# Patient Record
Sex: MALE | Race: WHITE | Employment: OTHER | URBAN - METROPOLITAN AREA
[De-identification: names, ages, dates, MRNs, and addresses within clinical notes are randomized per-mention and may not be internally consistent; named-entity substitution may affect disease eponyms.]

---

## 2018-02-20 ENCOUNTER — APPOINTMENT (OUTPATIENT)
Dept: GENERAL RADIOLOGY | Age: 53
End: 2018-02-20
Attending: EMERGENCY MEDICINE
Payer: MEDICARE

## 2018-02-20 ENCOUNTER — HOSPITAL ENCOUNTER (OUTPATIENT)
Age: 53
Discharge: HOME | End: 2018-02-20
Attending: EMERGENCY MEDICINE
Payer: MEDICARE

## 2018-02-20 VITALS
HEART RATE: 85 BPM | TEMPERATURE: 99 F | WEIGHT: 130 LBS | RESPIRATION RATE: 20 BRPM | BODY MASS INDEX: 23.92 KG/M2 | OXYGEN SATURATION: 99 % | SYSTOLIC BLOOD PRESSURE: 107 MMHG | HEIGHT: 62 IN | DIASTOLIC BLOOD PRESSURE: 64 MMHG

## 2018-02-20 DIAGNOSIS — R68.89 FLU-LIKE SYMPTOMS: Primary | ICD-10-CM

## 2018-02-20 LAB — POCT RAPID STREP: NEGATIVE

## 2018-02-20 PROCEDURE — 87081 CULTURE SCREEN ONLY: CPT | Performed by: EMERGENCY MEDICINE

## 2018-02-20 PROCEDURE — 71046 X-RAY EXAM CHEST 2 VIEWS: CPT | Performed by: EMERGENCY MEDICINE

## 2018-02-20 PROCEDURE — 99214 OFFICE O/P EST MOD 30 MIN: CPT

## 2018-02-20 PROCEDURE — 87430 STREP A AG IA: CPT | Performed by: EMERGENCY MEDICINE

## 2018-02-20 RX ORDER — ATORVASTATIN CALCIUM 20 MG/1
20 TABLET, FILM COATED ORAL NIGHTLY
COMMUNITY

## 2018-02-20 RX ORDER — OSELTAMIVIR PHOSPHATE 75 MG/1
75 CAPSULE ORAL 2 TIMES DAILY
Qty: 10 CAPSULE | Refills: 0 | Status: SHIPPED | OUTPATIENT
Start: 2018-02-20 | End: 2018-02-25

## 2018-02-20 NOTE — ED INITIAL ASSESSMENT (HPI)
Pt presents to [facility] with cough, headache, fever since yesterday. Chest clear all fields. Loose cough present upon arrival. Pt also has sore throat. Pt has hearing difficulties (forgot hearing aide).

## 2018-02-20 NOTE — ED PROVIDER NOTES
Patient presents with:  Sore Throat  Cough/URI  Fever (infectious)    HPI:     Lucas Carpenter is a 50year old male with hx of down's syndrome who presents with chief complaint of cough, congestion, sore throat and fever.   Started yesterday with cough, anselmo HISTORY: (As transcribed by Technologist)  Patient states he has had a cough and congestion since yesterday. FINDINGS:  LUNGS:  Minimal bilateral peribronchial thickening. No focal infiltrate or consolidation. CARDIAC:  Normal size cardiac silhouette.

## 2019-12-30 ENCOUNTER — OFFICE VISIT (OUTPATIENT)
Dept: FAMILY MEDICINE CLINIC | Facility: CLINIC | Age: 54
End: 2019-12-30
Payer: MEDICARE

## 2019-12-30 VITALS
TEMPERATURE: 98 F | SYSTOLIC BLOOD PRESSURE: 122 MMHG | BODY MASS INDEX: 23.92 KG/M2 | OXYGEN SATURATION: 98 % | HEART RATE: 78 BPM | RESPIRATION RATE: 18 BRPM | HEIGHT: 62 IN | WEIGHT: 130 LBS | DIASTOLIC BLOOD PRESSURE: 70 MMHG

## 2019-12-30 DIAGNOSIS — J06.9 VIRAL UPPER RESPIRATORY TRACT INFECTION: Primary | ICD-10-CM

## 2019-12-30 PROCEDURE — 99202 OFFICE O/P NEW SF 15 MIN: CPT | Performed by: PHYSICIAN ASSISTANT

## 2019-12-30 RX ORDER — CEFDINIR 300 MG/1
300 CAPSULE ORAL 2 TIMES DAILY
Qty: 14 CAPSULE | Refills: 0 | Status: SHIPPED | OUTPATIENT
Start: 2019-12-30 | End: 2020-01-06

## 2019-12-30 NOTE — PROGRESS NOTES
CHIEF COMPLAINT:   Patient presents with:  Cough: congestion x 3 days, no sneezing, intermittent coughing, no headache, no fever, no ST     HPI:   Genna Lilly is a 48year old male who presents for upper respiratory symptoms for  3 days.  Patient/caregive palpitations   GI: denies N/V/C or abdominal pain  NEURO: Denies headaches    EXAM:   /70 (BP Location: Left arm, Patient Position: Sitting, Cuff Size: adult)   Pulse 78   Temp 98 °F (36.7 °C) (Oral)   Resp 18   Ht 62\"   Wt 130 lb (59 kg)   SpO2 98% to the ER for any acute worsening of symptoms. Based off the duration and nature of your symptoms, you most likely have a viral upper respiratory infection.  Unfortunately, antibiotics will not treat viral infections and inappropriate use only leads to an primary care provider, or go to one of our [facility] or the [address] [facility] for further evaluation

## 2019-12-30 NOTE — PATIENT INSTRUCTIONS
Please follow up with your PCP if no improvement within 5-7 days. Go directly to the ER for any acute worsening of symptoms. Based off the duration and nature of your symptoms, you most likely have a viral upper respiratory infection.  Unfortunately, anti followed by worsening (double sickening) then please schedule a follow up with your primary care provider, or go to one of our [facility] or the [facility] for further evaluation

## 2021-08-02 ENCOUNTER — HOSPITAL ENCOUNTER (OUTPATIENT)
Age: 56
Discharge: HOME | End: 2021-08-02
Payer: MEDICARE

## 2021-08-02 ENCOUNTER — APPOINTMENT (OUTPATIENT)
Dept: GENERAL RADIOLOGY | Age: 56
End: 2021-08-02
Attending: NURSE PRACTITIONER
Payer: MEDICARE

## 2021-08-02 VITALS
RESPIRATION RATE: 18 BRPM | SYSTOLIC BLOOD PRESSURE: 137 MMHG | TEMPERATURE: 98 F | HEART RATE: 62 BPM | OXYGEN SATURATION: 100 % | DIASTOLIC BLOOD PRESSURE: 74 MMHG | WEIGHT: 130.06 LBS | BODY MASS INDEX: 24 KG/M2

## 2021-08-02 DIAGNOSIS — R05.9 COUGH: Primary | ICD-10-CM

## 2021-08-02 DIAGNOSIS — J01.90 ACUTE NON-RECURRENT SINUSITIS, UNSPECIFIED LOCATION: ICD-10-CM

## 2021-08-02 PROCEDURE — 71046 X-RAY EXAM CHEST 2 VIEWS: CPT | Performed by: NURSE PRACTITIONER

## 2021-08-02 PROCEDURE — 99203 OFFICE O/P NEW LOW 30 MIN: CPT | Performed by: NURSE PRACTITIONER

## 2021-08-02 RX ORDER — BENZONATATE 100 MG/1
100 CAPSULE ORAL 3 TIMES DAILY PRN
Qty: 30 CAPSULE | Refills: 0 | Status: SHIPPED | OUTPATIENT
Start: 2021-08-02 | End: 2021-09-01

## 2021-08-02 RX ORDER — AMOXICILLIN AND CLAVULANATE POTASSIUM 875; 125 MG/1; MG/1
1 TABLET, FILM COATED ORAL 2 TIMES DAILY
Qty: 20 TABLET | Refills: 0 | Status: SHIPPED | OUTPATIENT
Start: 2021-08-02 | End: 2021-08-12

## 2021-08-02 NOTE — ED PROVIDER NOTES
Patient Seen in: Immediate [address] [place] [address]      History   Patient presents with:  Cough/URI    Stated Complaint: sinus congestion    HPI/Subjective:   HPI  72-year-old male with history of Down syndrome and hypothyroid presents with a caregiver for complaint yellowish-green drainage. Mouth/Throat:      Pharynx: No oropharyngeal exudate or posterior oropharyngeal erythema. Cardiovascular:      Rate and Rhythm: Normal rate and regular rhythm. Heart sounds: Normal heart sounds.    Pulmonary:      Effort caregiver.                                Disposition and Plan     Clinical Impression:  Cough  (primary encounter diagnosis)  Acute non-recurrent sinusitis, unspecified location     Disposition:  Discharge  8/2/2021 12:16 pm    Follow-up:  Lincoln Laurent MD

## 2023-02-07 ENCOUNTER — APPOINTMENT (OUTPATIENT)
Dept: GENERAL RADIOLOGY | Age: 58
End: 2023-02-07
Attending: NURSE PRACTITIONER
Payer: MEDICARE

## 2023-02-07 ENCOUNTER — HOSPITAL ENCOUNTER (OUTPATIENT)
Age: 58
Discharge: HOME | End: 2023-02-07
Payer: MEDICARE

## 2023-02-07 VITALS
TEMPERATURE: 97 F | SYSTOLIC BLOOD PRESSURE: 120 MMHG | HEART RATE: 72 BPM | OXYGEN SATURATION: 99 % | DIASTOLIC BLOOD PRESSURE: 82 MMHG | RESPIRATION RATE: 16 BRPM

## 2023-02-07 DIAGNOSIS — J06.9 VIRAL URI WITH COUGH: Primary | ICD-10-CM

## 2023-02-07 LAB — SARS-COV-2 RNA RESP QL NAA+PROBE: NOT DETECTED

## 2023-02-07 PROCEDURE — U0002 COVID-19 LAB TEST NON-CDC: HCPCS | Performed by: NURSE PRACTITIONER

## 2023-02-07 PROCEDURE — 71046 X-RAY EXAM CHEST 2 VIEWS: CPT | Performed by: NURSE PRACTITIONER

## 2023-02-07 PROCEDURE — 99213 OFFICE O/P EST LOW 20 MIN: CPT | Performed by: NURSE PRACTITIONER

## 2023-02-07 RX ORDER — FLUTICASONE PROPIONATE 50 MCG
SPRAY, SUSPENSION (ML) NASAL DAILY
COMMUNITY

## 2023-02-07 RX ORDER — MOMETASONE FUROATE 1 MG/G
CREAM TOPICAL DAILY
COMMUNITY

## 2023-02-07 RX ORDER — LEVOTHYROXINE SODIUM 0.12 MG/1
125 TABLET ORAL
COMMUNITY

## 2023-02-07 RX ORDER — POLYVINYL ALCOHOL 14 MG/ML
1 SOLUTION/ DROPS OPHTHALMIC AS NEEDED
COMMUNITY

## 2023-02-07 RX ORDER — ERGOCALCIFEROL 1.25 MG/1
50000 CAPSULE ORAL WEEKLY
COMMUNITY

## 2023-02-10 ENCOUNTER — EXTERNAL LAB (OUTPATIENT)
Dept: OTHER | Age: 58
End: 2023-02-10

## 2023-02-10 LAB
25(OH)D3+25(OH)D2 SERPL-MCNC: 45.3 NG/ML (ref 30–100)
ALBUMIN SERPL-MCNC: 3.6 GM/DL (ref 3.4–4.8)
ALP SERPL-CCNC: 62 U/L (ref 38–126)
ALT SERPL-CCNC: 16 U/L (ref 2–58)
AST SERPL-CCNC: 25 U/L (ref 5–44)
BASOPHILS # BLD: 0.1 X10E3/UL (ref 0–0.2)
BASOPHILS NFR BLD: 1.7 % (ref 0–1.6)
BILIRUB SERPL-MCNC: 1.3 MG/DL (ref 0.2–1.2)
BUN SERPL-MCNC: 16 MG/DL (ref 5–28)
BUN/CREAT SERPL: 16 RATIO (ref 12–20)
CALCIUM SERPL-MCNC: 9.5 MG/DL (ref 8.4–10.2)
CHLORIDE SERPL-SCNC: 103 MEQ/L (ref 94–111)
CHOLEST SERPL-MCNC: 112 MG/DL
CO2 SERPL-SCNC: 29 MEQ/L (ref 20–33)
CREAT SERPL-MCNC: 1 MG/DL (ref 0.5–1.4)
EOSINOPHIL # BLD: 0 X10E3/UL (ref 0–0.5)
EOSINOPHIL NFR BLD: 0.3 % (ref 2–6)
ERYTHROCYTE [DISTWIDTH] IN BLOOD: 12.4 % (ref 11.5–15.5)
FOLATE SERPL-MCNC: 15.3 NG/ML (ref 3–20)
GLUCOSE SERPL-MCNC: 51 MG/DL (ref 64–112)
HBA1C MFR BLD: 5.1 % (ref 4.6–6.2)
HCT VFR BLD CALC: 46.8 % (ref 42–52)
HDLC SERPL-MCNC: 23 MG/DL
HGB BLD-MCNC: 15.5 GM/DL (ref 14–18)
LDLC SERPL CALC-MCNC: 72 MG/DL
LENGTH OF FAST TIME PATIENT: ABNORMAL H
LENGTH OF FAST TIME PATIENT: ABNORMAL H
LYMPHOCYTES # BLD: 1.9 X10E3/UL (ref 0.8–3)
LYMPHOCYTES NFR BLD: 34.3 % (ref 20–45)
MAGNESIUM SERPL-MCNC: 2.6 MG/DL (ref 1.3–2.5)
MCH RBC QN AUTO: 31.8 PG (ref 27–31)
MCHC RBC AUTO-ENTMCNC: 33.1 G/DL (ref 32–36)
MCV RBC AUTO: 96.1 FL (ref 80–94)
MONOCYTES # BLD: 0.7 X10E3/UL (ref 0.2–1)
MONOCYTES NFR BLD: 12.2 % (ref 2–15)
MPV (OFFPRE2): 7.9 FL (ref 4.4–10)
NEUTROPHILS # BLD: 2.9 X10E3/UL (ref 1.4–6.8)
NEUTROPHILS NFR BLD: 51.6 % (ref 40–70)
PLATELET # BLD: 239 THOUS/CUMM (ref 150–450)
POTASSIUM SERPL-SCNC: 5.2 MEQ/L (ref 3.5–5.1)
PROT SERPL-MCNC: 7.4 GM/DL (ref 5.6–8.3)
RBC # BLD: 4.87 MIL/CUMM (ref 4.7–6.2)
SODIUM SERPL-SCNC: 140 MEQ/L (ref 131–145)
TRIGL SERPL-MCNC: 86 MG/DL
TSH SERPL-ACNC: <0.01 UIU/ML (ref 0.34–5.6)
VIT B12 SERPL-MCNC: 737 PG/ML (ref 213–816)
WBC # BLD: 5.7 THOUS/CUMM (ref 4.8–10.8)

## 2023-02-22 ENCOUNTER — TELEPHONE (OUTPATIENT)
Dept: OTHER | Age: 58
End: 2023-02-22

## 2023-04-06 ENCOUNTER — OFFICE VISIT (OUTPATIENT)
Dept: OTHER | Age: 58
End: 2023-04-06

## 2023-04-06 VITALS
HEIGHT: 60 IN | HEART RATE: 57 BPM | RESPIRATION RATE: 14 BRPM | BODY MASS INDEX: 25.72 KG/M2 | OXYGEN SATURATION: 98 % | TEMPERATURE: 98.5 F | DIASTOLIC BLOOD PRESSURE: 62 MMHG | WEIGHT: 131 LBS | SYSTOLIC BLOOD PRESSURE: 90 MMHG

## 2023-04-06 DIAGNOSIS — Z11.59 NEED FOR HEPATITIS C SCREENING TEST: ICD-10-CM

## 2023-04-06 DIAGNOSIS — J30.2 SEASONAL ALLERGIES: ICD-10-CM

## 2023-04-06 DIAGNOSIS — Z97.4 WEARS HEARING AID IN BOTH EARS: ICD-10-CM

## 2023-04-06 DIAGNOSIS — Q90.9 DOWN SYNDROME: Primary | ICD-10-CM

## 2023-04-06 DIAGNOSIS — Z23 NEED FOR VACCINATION: ICD-10-CM

## 2023-04-06 DIAGNOSIS — E03.9 ACQUIRED HYPOTHYROIDISM: ICD-10-CM

## 2023-04-06 DIAGNOSIS — Z97.3 WEARS GLASSES: ICD-10-CM

## 2023-04-06 PROCEDURE — 99205 OFFICE O/P NEW HI 60 MIN: CPT | Performed by: FAMILY MEDICINE

## 2023-04-06 RX ORDER — FLUTICASONE PROPIONATE 50 MCG
SPRAY, SUSPENSION (ML) NASAL
COMMUNITY
Start: 2022-12-30

## 2023-04-06 RX ORDER — LEVOTHYROXINE SODIUM 112 UG/1
112 TABLET ORAL
COMMUNITY
Start: 2023-03-20

## 2023-04-06 RX ORDER — GUAIFENESIN 200 MG/10ML
LIQUID ORAL EVERY 4 HOURS PRN
COMMUNITY

## 2023-04-06 RX ORDER — EPINEPHRINE 0.3 MG/.3ML
INJECTION SUBCUTANEOUS
COMMUNITY
Start: 2023-01-20

## 2023-04-06 RX ORDER — MOMETASONE FUROATE 1 MG/G
CREAM TOPICAL
COMMUNITY
Start: 2022-12-30

## 2023-04-06 RX ORDER — SENNOSIDES 8.6 MG
650 CAPSULE ORAL
COMMUNITY

## 2023-04-06 RX ORDER — MONTELUKAST SODIUM 10 MG/1
TABLET ORAL
COMMUNITY
Start: 2023-03-17

## 2023-04-06 RX ORDER — IBUPROFEN 400 MG/1
TABLET ORAL
COMMUNITY
Start: 2022-12-30

## 2023-04-06 RX ORDER — POLYVINYL ALCOHOL 14 MG/ML
1 SOLUTION/ DROPS OPHTHALMIC PRN
COMMUNITY

## 2023-04-06 RX ORDER — CHOLECALCIFEROL (VITAMIN D3) 1250 MCG
1.25 CAPSULE ORAL
COMMUNITY

## 2023-04-06 RX ORDER — ATORVASTATIN CALCIUM 20 MG/1
TABLET, FILM COATED ORAL
COMMUNITY
Start: 2023-03-17

## 2023-04-06 ASSESSMENT — PATIENT HEALTH QUESTIONNAIRE - PHQ9
IS PATIENT ABLE TO COMPLETE PHQ2 OR PHQ9: LEARNING/COGNITIVE DISABILITY
IS THE PATIENT ABLE TO COGNITIVELY COMPLETE THE PHQ9: NO

## 2023-04-18 ENCOUNTER — CHARTING TRANS (OUTPATIENT)
Dept: OTHER | Age: 58
End: 2023-04-18

## 2023-04-19 ENCOUNTER — TELEPHONE (OUTPATIENT)
Dept: OTHER | Age: 58
End: 2023-04-19

## 2023-04-21 ENCOUNTER — EXTERNAL LAB (OUTPATIENT)
Dept: OTHER | Age: 58
End: 2023-04-21

## 2023-04-21 LAB
HCV AB SER QL: NONREACTIVE
LAB RESULT: NORMAL

## 2023-05-02 ENCOUNTER — TELEPHONE (OUTPATIENT)
Dept: OTHER | Age: 58
End: 2023-05-02

## 2024-03-11 ENCOUNTER — HOSPITAL ENCOUNTER (OUTPATIENT)
Age: 59
Discharge: HOME | End: 2024-03-11
Payer: COMMERCIAL

## 2024-03-11 ENCOUNTER — APPOINTMENT (OUTPATIENT)
Dept: GENERAL RADIOLOGY | Age: 59
End: 2024-03-11
Attending: NURSE PRACTITIONER
Payer: COMMERCIAL

## 2024-03-11 VITALS
HEART RATE: 59 BPM | BODY MASS INDEX: 28.32 KG/M2 | RESPIRATION RATE: 18 BRPM | WEIGHT: 150 LBS | OXYGEN SATURATION: 100 % | DIASTOLIC BLOOD PRESSURE: 70 MMHG | HEIGHT: 61 IN | SYSTOLIC BLOOD PRESSURE: 104 MMHG | TEMPERATURE: 98 F
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
## 2024-03-11 DIAGNOSIS — R07.9 CHEST PAIN OF UNCERTAIN ETIOLOGY: Primary | ICD-10-CM

## 2024-03-11 DIAGNOSIS — S20.219A CONTUSION OF RIB, UNSPECIFIED LATERALITY, INITIAL ENCOUNTER: ICD-10-CM

## 2024-03-11 PROCEDURE — A9150 MISC/EXPER NON-PRESCRIPT DRU: HCPCS | Performed by: NURSE PRACTITIONER

## 2024-03-11 PROCEDURE — 71111 X-RAY EXAM RIBS/CHEST4/> VWS: CPT | Performed by: NURSE PRACTITIONER

## 2024-03-11 PROCEDURE — 99213 OFFICE O/P EST LOW 20 MIN: CPT | Performed by: NURSE PRACTITIONER

## 2024-03-11 RX ORDER — ACETAMINOPHEN 500 MG
1000 TABLET ORAL ONCE
Status: COMPLETED | OUTPATIENT
Start: 2024-03-11 | End: 2024-03-11

## 2024-03-11 NOTE — DISCHARGE INSTRUCTIONS
The counter Motrin and/or Tylenol as needed for pain.  Follow-up with your doctor.  Go to the ER for new or worsening symptoms.
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
## 2024-04-08 ENCOUNTER — APPOINTMENT (OUTPATIENT)
Dept: OTHER | Age: 59
End: 2024-04-08

## 2024-05-28 ENCOUNTER — APPOINTMENT (OUTPATIENT)
Dept: OTHER | Age: 59
End: 2024-05-28

## 2024-08-20 ENCOUNTER — APPOINTMENT (OUTPATIENT)
Dept: OTHER | Age: 59
End: 2024-08-20

## 2024-08-20 VITALS
BODY MASS INDEX: 26.5 KG/M2 | DIASTOLIC BLOOD PRESSURE: 62 MMHG | TEMPERATURE: 98 F | SYSTOLIC BLOOD PRESSURE: 94 MMHG | HEIGHT: 60 IN | WEIGHT: 135 LBS | OXYGEN SATURATION: 98 % | HEART RATE: 72 BPM

## 2024-08-20 DIAGNOSIS — Q90.9 DOWN SYNDROME (CMD): Primary | ICD-10-CM

## 2024-08-20 DIAGNOSIS — E78.2 MIXED HYPERLIPIDEMIA: ICD-10-CM

## 2024-08-20 DIAGNOSIS — Z97.3 WEARS GLASSES: ICD-10-CM

## 2024-08-20 DIAGNOSIS — E03.9 ACQUIRED HYPOTHYROIDISM: ICD-10-CM

## 2024-08-20 DIAGNOSIS — R41.3 MEMORY CHANGE: ICD-10-CM

## 2024-08-20 DIAGNOSIS — Z97.4 WEARS HEARING AID IN BOTH EARS: ICD-10-CM

## 2024-08-20 ASSESSMENT — PATIENT HEALTH QUESTIONNAIRE - PHQ9
IS THE PATIENT ABLE TO COGNITIVELY COMPLETE THE PHQ9: NO
1. LITTLE INTEREST OR PLEASURE IN DOING THINGS: NOT AT ALL
2. FEELING DOWN, DEPRESSED OR HOPELESS: NOT AT ALL
SUM OF ALL RESPONSES TO PHQ9 QUESTIONS 1 AND 2: 0
IS PATIENT ABLE TO COMPLETE PHQ2 OR PHQ9: LEARNING/COGNITIVE DISABILITY
SUM OF ALL RESPONSES TO PHQ9 QUESTIONS 1 AND 2: 0
CLINICAL INTERPRETATION OF PHQ2 SCORE: NO FURTHER SCREENING NEEDED

## 2024-12-18 ENCOUNTER — HOSPITAL ENCOUNTER (OUTPATIENT)
Age: 59
Discharge: HOME | End: 2024-12-18
Payer: MEDICARE

## 2024-12-18 VITALS
OXYGEN SATURATION: 100 % | HEART RATE: 63 BPM | DIASTOLIC BLOOD PRESSURE: 69 MMHG | RESPIRATION RATE: 16 BRPM | BODY MASS INDEX: 22.5 KG/M2 | HEIGHT: 66 IN | TEMPERATURE: 98 F | WEIGHT: 140 LBS | SYSTOLIC BLOOD PRESSURE: 117 MMHG

## 2024-12-18 DIAGNOSIS — J30.81 ALLERGY TO CATS: Primary | ICD-10-CM

## 2024-12-18 DIAGNOSIS — J06.9 UPPER RESPIRATORY TRACT INFECTION, UNSPECIFIED TYPE: ICD-10-CM

## 2024-12-18 PROCEDURE — 99213 OFFICE O/P EST LOW 20 MIN: CPT | Performed by: NURSE PRACTITIONER

## 2024-12-18 RX ORDER — CETIRIZINE HYDROCHLORIDE 10 MG/1
10 TABLET ORAL DAILY
COMMUNITY

## 2024-12-18 NOTE — ED INITIAL ASSESSMENT (HPI)
Patient has been having congestion since Monday. He has a history of becoming very sick with respiratory related issues. They want him checked to see if there is anything they can do to prevent him from getting worse. Patient has Down Syndrome and states he is \"fine\".

## 2024-12-18 NOTE — ED PROVIDER NOTES
Patient Seen in: [facility]      History     Chief Complaint   Patient presents with    Cough/URI     Stated Complaint: Congestion    Subjective:   HPI      Patient is a 59-year-old male who is here today with congestion and sinus pressure since Monday.  Reports that now he has a cough.  He is here with one of his caregivers who is concerned as the patient usually ends up with significant respiratory related issues.  Patient was exposed to 2 cats when he visited his family for the holiday.  And again this past weekend.  He does have a allergy to cats he denies fevers, chills, nausea, vomiting.  Patient reports that he has no symptoms when asked.    Objective:     Past Medical History:    Chronic lymphocytic thyroiditis    Down syndrome (HCC)    Hearing loss    Hypothyroid    Onychomycosis              History reviewed. No pertinent surgical history.             Social History     Socioeconomic History    Marital status: Single   Tobacco Use    Smoking status: Never    Smokeless tobacco: Never   Vaping Use    Vaping status: Never Used   Substance and Sexual Activity    Alcohol use: Not Currently    Drug use: Never     Social Drivers of Health      Received from [facility], [facility]    Housing Stability              Review of Systems    Positive for stated complaint: Congestion  Other systems are as noted in HPI.  Constitutional and vital signs reviewed.      All other systems reviewed and negative except as noted above.    Physical Exam     ED Triage Vitals [12/18/24 1303]   /69   Pulse 63   Resp 16   Temp 98.1 °F (36.7 °C)   Temp src Oral   SpO2 100 %   O2 Device None (Room air)       Current Vitals:   Vital Signs  BP: 117/69  Pulse: 63  Resp: 16  Temp: 98.1 °F (36.7 °C)  Temp src: Oral    Oxygen Therapy  SpO2: 100 %  O2 Device: None (Room air)        Physical Exam  VS: Vital signs reviewed. O2 saturation within normal limits for this patient     General:  Patient is awake and alert, oriented to person, place and time. Not in acute distress.      HEENT: Head is normocephalic atraumatic. Pupils reactive bilaterally.  EOMs intact.  No oral pallor. Mucous membranes moist.      Lungs: good inspiratory effort. +air entry bilaterally without wheezes, rhonchi, crackles.  No accessory muscle use or tachypnea.       Extremities: No edema.  Pulses 2+ extremities.   Brisk capillary refill noted.      Skin: Normal skin turgor     CNS: Moves all 4 extremities.  Interacts appropriately.  No tremor.  No gait abnormality        ED Course   Labs Reviewed - No data to display         I have personally  reviewed available prior medical records for any recent pertinent discharge summaries/testing. Patient/family updated on results and plan, a verbalized understanding and agreement with the plan.  I explained to the patient that emergent conditions may arise and to go to the ER for new, worsening or any persistent conditions. I've explained the importance of taking all medicatons as prescribed, follow up, and return precuations,  All questions answered.    Please note that this report has been produced using speech recognition software and may contain errors related to that system including, but not limited to, errors in grammar, punctuation, and spelling, as well as words and phrases that possibly may have been recognized inappropriately.  If there are any questions or concerns, contact the dictating provider for clarification.       MDM      Patient presents with cough, patient is nontoxic, does not meet SIRS criteria.   Patient does not have uvula deviation or unilateral tonsillar swelling to indicate tonsillar abscess. No meningsmus or trismus. No dysphagia or difficulty handing secretions. No evidence for otitis media. Patient does not have any respiratory distress.  O2 saturation within normal limits for this patient. Does not appear clinically dehydrated and is tolerating oral  intake. Presentation consistent with a URI.. Encouraged patient on oral hydration and supportive care. Recommend follow up with PCP.  Return to ED precautions discussed with the patient and family.        Medical Decision Making      Disposition and Plan     Clinical Impression:  1. Allergy to cats    2. Upper respiratory tract infection, unspecified type         Disposition:  Discharge  12/18/2024  1:39 pm    Follow-up:  Saroj Whitten MD  [facility] [address]  [phone]                Medications Prescribed:  Discharge Medication List as of 12/18/2024  1:40 PM              Supplementary Documentation:

## 2025-01-17 ENCOUNTER — HOSPITAL ENCOUNTER (OUTPATIENT)
Age: 60
Discharge: HOME | End: 2025-01-17
Payer: MEDICARE

## 2025-01-17 VITALS — TEMPERATURE: 99 F | OXYGEN SATURATION: 96 % | RESPIRATION RATE: 20 BRPM | HEART RATE: 91 BPM

## 2025-01-17 DIAGNOSIS — J11.1 INFLUENZA: ICD-10-CM

## 2025-01-17 DIAGNOSIS — R05.9 COUGH: Primary | ICD-10-CM

## 2025-01-17 LAB
POCT INFLUENZA A: POSITIVE
POCT INFLUENZA B: NEGATIVE

## 2025-01-17 RX ORDER — OSELTAMIVIR PHOSPHATE 75 MG/1
75 CAPSULE ORAL 2 TIMES DAILY
Qty: 10 CAPSULE | Refills: 0 | Status: SHIPPED | OUTPATIENT
Start: 2025-01-17 | End: 2025-01-22

## 2025-01-17 NOTE — ED PROVIDER NOTES
Patient Seen in: [facility]      History     Chief Complaint   Patient presents with    Cough/URI    Fever     Stated Complaint: cough/fever    Subjective:   59-year-old male, with history of Down syndrome, hypothyroidism, brought in by his caregiver for development of cough and fever that started this morning.  Caregiver states he was given guaifenesin and Tylenol earlier today.  No vomiting.  No known sick exposure.              Objective:     Past Medical History:    Chronic lymphocytic thyroiditis    Down syndrome (HCC)    Hearing loss    Hypothyroid    Onychomycosis              History reviewed. No pertinent surgical history.             Social History     Socioeconomic History    Marital status: Single   Tobacco Use    Smoking status: Never    Smokeless tobacco: Never   Vaping Use    Vaping status: Never Used   Substance and Sexual Activity    Alcohol use: Not Currently    Drug use: Never     Social Drivers of Health      Received from [facility], [facility]    Housing Stability              Review of Systems   Constitutional:  Positive for fever.   Respiratory:  Positive for cough.    All other systems reviewed and are negative.      Positive for stated complaint: cough/fever  Other systems are as noted in HPI.  Constitutional and vital signs reviewed.      All other systems reviewed and negative except as noted above.    Physical Exam     ED Triage Vitals [01/17/25 1053]   BP    Pulse 91   Resp 20   Temp 99.2 °F (37.3 °C)   Temp src Oral   SpO2 96 %   O2 Device None (Room air)       Current Vitals:   Vital Signs  Pulse: 91  Resp: 20  Temp: 99.2 °F (37.3 °C)  Temp src: Oral    Oxygen Therapy  SpO2: 96 %  O2 Device: None (Room air)        Physical Exam  Vitals and nursing note reviewed.   Constitutional:       General: He is not in acute distress.     Appearance: Normal appearance. He is not ill-appearing, toxic-appearing or diaphoretic.   HENT:      Head:       Jaw: No trismus.      Mouth/Throat:      Lips: Pink.      Mouth: Mucous membranes are moist. No angioedema.      Pharynx: Oropharynx is clear. Uvula midline. No pharyngeal swelling or posterior oropharyngeal erythema.   Cardiovascular:      Rate and Rhythm: Normal rate and regular rhythm.      Pulses: Normal pulses.      Heart sounds: Normal heart sounds.   Pulmonary:      Effort: Pulmonary effort is normal. No respiratory distress.      Breath sounds: Normal breath sounds.   Musculoskeletal:      Cervical back: Normal range of motion and neck supple.   Skin:     General: Skin is warm and dry.      Coloration: Skin is not jaundiced or pale.   Neurological:      General: No focal deficit present.      Mental Status: He is alert. Mental status is at baseline.   Psychiatric:         Mood and Affect: Mood normal.         Behavior: Behavior normal.             ED Course     Labs Reviewed   POCT FLU TEST - Abnormal; Notable for the following components:       Result Value    POCT INFLUENZA A Positive (*)     All other components within normal limits    Narrative:     This assay is a rapid molecular in vitro test utilizing nucleic acid amplification of influenza A and B viral RNA.                   MDM              Medical Decision Making  Nontoxic adult male patient with cough and fever.  No adventitious breath sounds, no signs of respiratory distress.  Differential clued viral versus bacterial etiology.  Positive for influenza A, my suspicion is viral etiology.  Will prescribe Tamiflu.    Supportive/home management of diagnosis/illness/injury discussed. Red flag symptoms discussed.  Signs and symptoms/criteria that would necessitate reevaluation, including ER evaluation discussed.  Patient and/or responsible adult verbalize and agree with management and plan of care.    Speech recognition software was used during this dictation.  There may be minor errors in transcription.      Amount and/or Complexity of Data  Reviewed  Independent Historian: caregiver  Labs: ordered. Decision-making details documented in ED Course.    Risk  Prescription drug management.        Disposition and Plan     Clinical Impression:  1. Cough    2. Influenza         Disposition:  Discharge  1/17/2025 11:19 am    Follow-up:  Saroj Whitten MD  [facility]  [facility] [address]  [phone]    Schedule an appointment as soon as possible for a visit             Medications Prescribed:  Current Discharge Medication List        START taking these medications    Details   oseltamivir (TAMIFLU) 75 MG Oral Cap Take 1 capsule (75 mg total) by mouth 2 (two) times daily for 5 days.  Qty: 10 capsule, Refills: 0                 Supplementary Documentation:                                                            98.5

## 2025-01-17 NOTE — ED INITIAL ASSESSMENT (HPI)
Patient started today with cough and fever of 100.2. He had covid in the last 90 days. His appetite was less today as well.

## 2025-02-07 ENCOUNTER — EXTERNAL LAB (OUTPATIENT)
Dept: HEALTH INFORMATION MANAGEMENT | Facility: OTHER | Age: 60
End: 2025-02-07

## 2025-02-07 LAB
ALBUMIN SERPL-MCNC: 3.8 G/DL (ref 3.4–4.8)
ALBUMIN/GLOB SERPL: 1.1 {RATIO} (ref 0.7–2.1)
ALP SERPL-CCNC: 59 U/L (ref 38–126)
ALT SERPL-CCNC: 11 U/L (ref 2–58)
AST SERPL-CCNC: 23 U/L (ref 5–44)
BASOPHILS # BLD: 0.13 X10E3/UL (ref 0–0.2)
BASOPHILS NFR BLD: 2.7 % (ref 0.1–1.3)
BILIRUB SERPL-MCNC: 1.2 MG/DL (ref 0.2–1.2)
BUN SERPL-MCNC: 15 MG/DL (ref 5–28)
BUN/CREAT SERPL: 17 (ref 12–20)
CALCIUM SERPL-MCNC: 9 MG/DL (ref 5–44)
CHLORIDE SERPL-SCNC: 106 MEQ/L (ref 94–111)
CHOLEST SERPL-MCNC: 117 MG/DL
CO2 SERPL-SCNC: 28 MEQ/L (ref 20–33)
CREAT SERPL-MCNC: 0.9 MG/DL (ref 0.5–1.4)
EOSINOPHIL # BLD: 0.03 X10E3/UL (ref 0–0.4)
EOSINOPHIL NFR BLD: 0.6 %
ERYTHROCYTE [DISTWIDTH] IN BLOOD: 14.1 % (ref 11.6–16.4)
EST. AVERAGE GLUCOSE BLD GHB EST-MCNC: 105 MG/DL
FOLATE SERPL-MCNC: 10.4 NG/ML (ref 3–20)
GFR SERPLBLD SCHWARTZ-ARVRAT: 100 ML/MIN/1.73M2
GLOBULIN SER-MCNC: 3.6 G/DL (ref 2–4.5)
GLUCOSE SERPL-MCNC: 66 MG/DL (ref 64–112)
HBA1C MFR BLD: 5.3 % (ref 4.6–6.2)
HCT VFR BLD CALC: 43.7 % (ref 37.5–51)
HDLC SERPL-MCNC: 30 MG/DL
HGB BLD-MCNC: 14.4 G/DL (ref 13–17.7)
IMM GRANULOCYTES # BLD: 0.03 X10E3/UL (ref 0–0.1)
IMM GRANULOCYTES NFR BLD: 0.6 %
LDLC SERPL CALC-MCNC: 68 MG/DL
LENGTH OF FAST TIME PATIENT: ABNORMAL H
LENGTH OF FAST TIME PATIENT: NORMAL H
LYMPHOCYTES # BLD: 1.68 X10E3/UL (ref 0.7–3.1)
LYMPHOCYTES NFR BLD: 35 % (ref 12.4–46.8)
MAGNESIUM SERPL-MCNC: 2.34 MG/DL (ref 1.3–2.5)
MCH RBC QN AUTO: 32.9 PG (ref 26.6–33)
MCHC RBC AUTO-ENTMCNC: 33 G/DL (ref 31.5–35.7)
MCV RBC AUTO: 99.8 FL (ref 79–97)
MONOCYTES # BLD: 0.62 X10E3/UL (ref 0.1–0.9)
MONOCYTES NFR BLD: 12.9 % (ref 3.6–12.8)
NEUTROPHILS # BLD: 2.31 X10E3/UL (ref 1.4–7)
NEUTROPHILS NFR BLD: 48.2 % (ref 40–77.2)
PLATELET # BLD: 194 X10E3/UL (ref 150–450)
PMV BLD AUTO: 11 FL (ref 4.4–10)
POTASSIUM SERPL-SCNC: 4 MEQ/L (ref 3.5–5.1)
PROT SERPL-MCNC: 7.4 G/DL (ref 5.6–8.3)
RBC # BLD: 4.38 X10E6/UL (ref 4.14–5.8)
SODIUM SERPL-SCNC: 140 MEQ/L (ref 131–145)
TRIGL SERPL-MCNC: 95 MG/DL
TSH SERPL-ACNC: 1.21 ULU/ML (ref 0.34–5.6)
VIT B12 SERPL-MCNC: 546 PG/ML (ref 180–914)
WBC # BLD: 4.8 X10E3/UL (ref 3.4–10.8)

## 2025-02-18 ENCOUNTER — APPOINTMENT (OUTPATIENT)
Dept: OTHER | Age: 60
End: 2025-02-18

## 2025-02-18 VITALS
DIASTOLIC BLOOD PRESSURE: 60 MMHG | HEIGHT: 60 IN | SYSTOLIC BLOOD PRESSURE: 112 MMHG | BODY MASS INDEX: 25.32 KG/M2 | OXYGEN SATURATION: 98 % | WEIGHT: 129 LBS | TEMPERATURE: 98.2 F | HEART RATE: 58 BPM

## 2025-02-18 DIAGNOSIS — E03.9 ACQUIRED HYPOTHYROIDISM: ICD-10-CM

## 2025-02-18 DIAGNOSIS — E78.2 MIXED HYPERLIPIDEMIA: ICD-10-CM

## 2025-02-18 DIAGNOSIS — Z97.4 WEARS HEARING AID IN BOTH EARS: ICD-10-CM

## 2025-02-18 DIAGNOSIS — Z28.21 VACCINATION DECLINED BY PATIENT: ICD-10-CM

## 2025-02-18 DIAGNOSIS — R41.3 MEMORY CHANGE: ICD-10-CM

## 2025-02-18 DIAGNOSIS — Z97.3 WEARS GLASSES: ICD-10-CM

## 2025-02-18 DIAGNOSIS — Q90.9 DOWN SYNDROME (CMD): Primary | ICD-10-CM

## 2025-02-18 PROCEDURE — 99215 OFFICE O/P EST HI 40 MIN: CPT | Performed by: FAMILY MEDICINE

## 2025-02-18 RX ORDER — CETIRIZINE HYDROCHLORIDE 10 MG/1
1 TABLET ORAL DAILY
COMMUNITY

## 2025-02-18 RX ORDER — OSELTAMIVIR PHOSPHATE 75 MG/1
CAPSULE ORAL
COMMUNITY
Start: 2025-01-17 | End: 2025-02-18 | Stop reason: ALTCHOICE

## 2025-02-18 ASSESSMENT — PATIENT HEALTH QUESTIONNAIRE - PHQ9
CLINICAL INTERPRETATION OF PHQ2 SCORE: NO FURTHER SCREENING NEEDED
IS PATIENT ABLE TO COMPLETE PHQ2 OR PHQ9: LEARNING/COGNITIVE DISABILITY
IS THE PATIENT ABLE TO COGNITIVELY COMPLETE THE PHQ9: NO
2. FEELING DOWN, DEPRESSED OR HOPELESS: NOT AT ALL
SUM OF ALL RESPONSES TO PHQ9 QUESTIONS 1 AND 2: 0
1. LITTLE INTEREST OR PLEASURE IN DOING THINGS: NOT AT ALL
SUM OF ALL RESPONSES TO PHQ9 QUESTIONS 1 AND 2: 0

## 2025-02-25 ENCOUNTER — TELEPHONE (OUTPATIENT)
Dept: OTHER | Age: 60
End: 2025-02-25

## 2025-08-19 ENCOUNTER — TELEPHONE (OUTPATIENT)
Dept: OTHER | Age: 60
End: 2025-08-19

## 2025-08-19 ENCOUNTER — APPOINTMENT (OUTPATIENT)
Dept: OTHER | Age: 60
End: 2025-08-19

## 2025-08-19 VITALS
HEART RATE: 57 BPM | OXYGEN SATURATION: 98 % | BODY MASS INDEX: 25.29 KG/M2 | SYSTOLIC BLOOD PRESSURE: 102 MMHG | WEIGHT: 123.8 LBS | DIASTOLIC BLOOD PRESSURE: 66 MMHG | TEMPERATURE: 97.2 F

## 2025-08-19 DIAGNOSIS — F40.298 FEAR OF FALLING: ICD-10-CM

## 2025-08-19 DIAGNOSIS — E03.9 ACQUIRED HYPOTHYROIDISM: ICD-10-CM

## 2025-08-19 DIAGNOSIS — R26.89 BALANCE PROBLEM: ICD-10-CM

## 2025-08-19 DIAGNOSIS — G30.0 MILD EARLY ONSET ALZHEIMER'S DEMENTIA WITH AGITATION  (CMD): ICD-10-CM

## 2025-08-19 DIAGNOSIS — Z23 NEED FOR PNEUMOCOCCAL VACCINATION: ICD-10-CM

## 2025-08-19 DIAGNOSIS — F02.A11 MILD EARLY ONSET ALZHEIMER'S DEMENTIA WITH AGITATION  (CMD): ICD-10-CM

## 2025-08-19 DIAGNOSIS — Q90.9 DOWN SYNDROME (CMD): Primary | ICD-10-CM

## 2025-08-19 PROCEDURE — 99214 OFFICE O/P EST MOD 30 MIN: CPT | Performed by: FAMILY MEDICINE

## 2025-08-19 PROCEDURE — 90677 PCV20 VACCINE IM: CPT | Performed by: FAMILY MEDICINE

## 2025-08-19 PROCEDURE — G0009 ADMIN PNEUMOCOCCAL VACCINE: HCPCS | Performed by: FAMILY MEDICINE

## 2025-12-16 ENCOUNTER — APPOINTMENT (OUTPATIENT)
Dept: OTHER | Age: 60
End: 2025-12-16